# Patient Record
Sex: FEMALE | Race: BLACK OR AFRICAN AMERICAN | NOT HISPANIC OR LATINO | Employment: PART TIME | URBAN - METROPOLITAN AREA
[De-identification: names, ages, dates, MRNs, and addresses within clinical notes are randomized per-mention and may not be internally consistent; named-entity substitution may affect disease eponyms.]

---
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
## 2025-03-07 ENCOUNTER — OFFICE VISIT (OUTPATIENT)
Dept: URGENT CARE | Facility: CLINIC | Age: 20
End: 2025-03-07
Payer: COMMERCIAL

## 2025-03-07 VITALS
HEIGHT: 62 IN | BODY MASS INDEX: 24.34 KG/M2 | RESPIRATION RATE: 18 BRPM | DIASTOLIC BLOOD PRESSURE: 80 MMHG | HEART RATE: 84 BPM | WEIGHT: 132.25 LBS | SYSTOLIC BLOOD PRESSURE: 112 MMHG | OXYGEN SATURATION: 98 % | TEMPERATURE: 98 F

## 2025-03-07 DIAGNOSIS — L98.9 SKIN LESION OF CHEST WALL: Primary | ICD-10-CM

## 2025-03-07 RX ORDER — SULFAMETHOXAZOLE AND TRIMETHOPRIM 800; 160 MG/1; MG/1
1 TABLET ORAL 2 TIMES DAILY
Qty: 14 TABLET | Refills: 0 | Status: SHIPPED | OUTPATIENT
Start: 2025-03-07 | End: 2025-03-14

## 2025-03-07 RX ORDER — NORETHINDRONE ACETATE AND ETHINYL ESTRADIOL .02; 1 MG/1; MG/1
1 TABLET ORAL DAILY
COMMUNITY
Start: 2024-12-30

## 2025-03-07 NOTE — PATIENT INSTRUCTIONS
Warm compresses 4-5x daily. Take bactrim antibiotic until completion. If not resolved after treatment, or if lesion reoccurs, follow up with dermatologist. Call  to schedule. Likely infected sebaceous cyst based on exam.

## 2025-03-07 NOTE — PROGRESS NOTES
"Subjective:      Patient ID: Manuela Jordan is a 20 y.o. female.    Vitals:  height is 5' 2" (1.575 m) and weight is 60 kg (132 lb 4.4 oz). Her oral temperature is 98 °F (36.7 °C). Her blood pressure is 112/80 and her pulse is 84. Her respiration is 18 and oxygen saturation is 98%.     Chief Complaint: Abscess    Patient presents with possible abscess under the left breast. On set of symptoms Tuesday. OTC - nothing. Patient "popped" the abscess multiple times, and it is getting smaller. The area is red, raised and warm to touch.     Abscess  Chronicity:  NewProgression Since Onset: rapidly worsening  Location:  Torso  Associated Symptoms: no fever, no chills, no sweats  Characteristics: painful, redness and swelling    Characteristics: not draining, no itching, no dryness, no scaling, no peeling, no bruising and no blistering    Pain Scale:  3/10  Treatments Tried:  Nothing  Relieved by:  Nothing  Worsened by:  Nothing      Constitution: Negative for chills and fever.   Skin:  Positive for abscess. Negative for erythema.      Objective:     Physical Exam   Constitutional: She is oriented to person, place, and time. She appears well-developed.   HENT:   Head: Normocephalic and atraumatic. Head is without abrasion, without contusion and without laceration.   Ears:   Right Ear: External ear normal.   Left Ear: External ear normal.   Nose: Nose normal.   Mouth/Throat: Oropharynx is clear and moist and mucous membranes are normal.   Eyes: Conjunctivae, EOM and lids are normal. Pupils are equal, round, and reactive to light.   Neck: Trachea normal and phonation normal. Neck supple.   Cardiovascular: Normal rate, regular rhythm and normal heart sounds.   Pulmonary/Chest: Effort normal and breath sounds normal. No stridor. No respiratory distress.       Musculoskeletal: Normal range of motion.         General: Normal range of motion.   Neurological: She is alert and oriented to person, place, and time.   Skin: Skin is warm, " dry, intact and no rash. Capillary refill takes less than 2 seconds. No abrasion, No burn, No bruising, No erythema and No ecchymosis   Psychiatric: Her speech is normal and behavior is normal. Judgment and thought content normal.   Nursing note and vitals reviewed.      Assessment:     1. Skin lesion of chest wall        Plan:       Skin lesion of chest wall  -     sulfamethoxazole-trimethoprim 800-160mg (BACTRIM DS) 800-160 mg Tab; Take 1 tablet by mouth 2 (two) times daily. for 7 days  Dispense: 14 tablet; Refill: 0  -     Ambulatory referral/consult to Dermatology    Colleen Garcia PA-C  [facility]       Patient Instructions   Warm compresses 4-5x daily. Take bactrim antibiotic until completion. If not resolved after treatment, or if lesion reoccurs, follow up with dermatologist. Call  to schedule. Likely infected sebaceous cyst based on exam.         Medical Decision Making:   Urgent Care Management:  Suspect inflammed sebaceous cyst - no fluctuance amenable to I&D in clinic. Recommend compresses, bactrim. F/u with derm for complete excision if not improved after tx or reoccurs. Return to clinic if becomes fluctuant

## 2025-03-13 ENCOUNTER — OFFICE VISIT (OUTPATIENT)
Dept: URGENT CARE | Facility: CLINIC | Age: 20
End: 2025-03-13
Payer: COMMERCIAL

## 2025-03-13 VITALS
WEIGHT: 130.06 LBS | DIASTOLIC BLOOD PRESSURE: 81 MMHG | RESPIRATION RATE: 18 BRPM | SYSTOLIC BLOOD PRESSURE: 135 MMHG | HEIGHT: 62 IN | BODY MASS INDEX: 23.93 KG/M2 | HEART RATE: 91 BPM | OXYGEN SATURATION: 98 % | TEMPERATURE: 98 F

## 2025-03-13 DIAGNOSIS — T50.905A ADVERSE EFFECT OF DRUG, INITIAL ENCOUNTER: ICD-10-CM

## 2025-03-13 DIAGNOSIS — R11.2 NAUSEA AND VOMITING, UNSPECIFIED VOMITING TYPE: Primary | ICD-10-CM

## 2025-03-13 PROCEDURE — 99214 OFFICE O/P EST MOD 30 MIN: CPT | Mod: S$GLB,,, | Performed by: PHYSICIAN ASSISTANT

## 2025-03-13 PROCEDURE — S0119 ONDANSETRON 4 MG: HCPCS | Mod: S$GLB,,, | Performed by: EMERGENCY MEDICINE

## 2025-03-13 RX ORDER — ONDANSETRON 4 MG/1
4 TABLET, ORALLY DISINTEGRATING ORAL EVERY 8 HOURS PRN
Qty: 12 TABLET | Refills: 0 | Status: SHIPPED | OUTPATIENT
Start: 2025-03-13 | End: 2025-03-17

## 2025-03-13 RX ORDER — ONDANSETRON 4 MG/1
4 TABLET, ORALLY DISINTEGRATING ORAL
Status: COMPLETED | OUTPATIENT
Start: 2025-03-13 | End: 2025-03-13

## 2025-03-13 RX ADMIN — ONDANSETRON 4 MG: 4 TABLET, ORALLY DISINTEGRATING ORAL at 04:03

## 2025-03-13 NOTE — PATIENT INSTRUCTIONS
Take zofran every 8 hours as needed for nausea/vomiting. Make sure to take the zofran 45 minutes before your dose of bactrim antibiotic. Take the antibiotic with a little bit of food. Drink plenty of liquids and may drink Pedialyte OR add Powerade to water. However, avoid drinking only sports drinks as they contain a lot of sugar, which can actually worsen diarrhea. Slowly add bland diet as you are able to keep down liquids (bananas, rice, applesauce, toast, mashed potatoes). If you have been vomiting, avoid Motrin or NSAIDS which can worsen GI upset.     Follow up with dermatologist for skin cyst - call  to schedule.     You need to go to the ER if you are still having intractable vomiting or if you are not producing urine at least every 8 hours (to make sure you are not becoming dehydrated), if you are having severe abdominal pain, localized right lower abdominal pain, bloody vomit, or persistently having high volume black or bloody stool.

## 2025-03-13 NOTE — LETTER
March 13, 2025      [facility] [address] [place] [address]  Phone: [phone]  Fax: [phone]       Patient: Manuela Jordan   YOB: 2005  Date of Visit: 03/13/2025    To Whom It May Concern:    Jay Jordan  was at [facility] on 03/13/2025. The patient may return to work/school on 3/14/25 with no restrictions. If you have any questions or concerns, or if I can be of further assistance, please do not hesitate to contact me.    Sincerely,    Colleen Garcia PA-C  [facility]

## 2025-03-13 NOTE — PROGRESS NOTES
"Subjective:      Patient ID: Manuela Jordan is a 20 y.o. female.    Vitals:  height is 5' 2" (1.575 m) and weight is 59 kg (130 lb 1.1 oz). Her tympanic temperature is 97.9 °F (36.6 °C). Her blood pressure is 135/81 and her pulse is 91. Her respiration is 18 and oxygen saturation is 98%.     Chief Complaint: Vomiting    Patient presents with nausea, vomiting and abdominal cramps.  Vomited 3x today. Was able to keep down a powerade. Symptoms started 1 day ago. Nausea x 5 days. No diarrhea, fever, bloody stool, suspicious food intake, or sick contacts.   Patient is taking Bactrim for a cyst under breast x 7 days. States she is on day 5 of abx. Cyst is improving. No drainage      Emesis   This is a new problem. The current episode started yesterday. The problem has been gradually worsening. There has been no fever. Associated symptoms include abdominal pain and sweats. Pertinent negatives include no chest pain, chills, coughing, decreased urine volume, diarrhea, dizziness, fever or headaches. She has tried acetaminophen for the symptoms. The treatment provided mild relief.       Constitution: Negative for chills and fever.   Cardiovascular:  Negative for chest pain.   Respiratory:  Negative for cough, sputum production and shortness of breath.    Gastrointestinal:  Positive for abdominal pain, nausea and vomiting. Negative for diarrhea.   Genitourinary:  Negative for dysuria, frequency, urgency, urine decreased, flank pain, bladder incontinence, bed wetting and hematuria.   Skin:  Positive for abscess.   Neurological:  Negative for dizziness and headaches.      Objective:     Physical Exam   Constitutional: She is oriented to person, place, and time. She appears well-developed.   HENT:   Head: Normocephalic and atraumatic.   Ears:   Right Ear: External ear normal.   Left Ear: External ear normal.   Nose: Nose normal.   Mouth/Throat: Mucous membranes are normal. Oropharynx is clear.   Eyes: Conjunctivae and lids are " normal.   Neck: Trachea normal. Neck supple.   Cardiovascular: Normal rate, regular rhythm and normal heart sounds.   Pulmonary/Chest: Effort normal and breath sounds normal. No respiratory distress.       Abdominal: Normal appearance and bowel sounds are normal. She exhibits no distension and no mass. Soft. There is no abdominal tenderness. There is no rebound, no guarding, no left CVA tenderness and no right CVA tenderness.   Musculoskeletal: Normal range of motion.         General: Normal range of motion.   Neurological: She is alert and oriented to person, place, and time. She has normal strength.   Skin: Skin is warm, dry, intact, not diaphoretic and not pale.   Psychiatric: Her speech is normal and behavior is normal. Judgment and thought content normal.   Nursing note and vitals reviewed.      Assessment:     1. Nausea and vomiting, unspecified vomiting type    2. Adverse effect of drug, initial encounter        Plan:       Nausea and vomiting, unspecified vomiting type  -     ondansetron disintegrating tablet 4 mg  -     ondansetron (ZOFRAN-ODT) 4 MG TbDL; Take 1 tablet (4 mg total) by mouth every 8 (eight) hours as needed (nausea/vomiting).  Dispense: 12 tablet; Refill: 0    Adverse effect of drug, initial encounter      Colleen Garcia PA-C  [facility]       Patient Instructions   Take zofran every 8 hours as needed for nausea/vomiting. Make sure to take the zofran 45 minutes before your dose of bactrim antibiotic. Take the antibiotic with a little bit of food. Drink plenty of liquids and may drink Pedialyte OR add Powerade to water. However, avoid drinking only sports drinks as they contain a lot of sugar, which can actually worsen diarrhea. Slowly add bland diet as you are able to keep down liquids (bananas, rice, applesauce, toast, mashed potatoes). If you have been vomiting, avoid Motrin or NSAIDS which can worsen GI upset.     Follow up with dermatologist for skin cyst - call [phone]  [phone] to schedule.     You need to go to the ER if you are still having intractable vomiting or if you are not producing urine at least every 8 hours (to make sure you are not becoming dehydrated), if you are having severe abdominal pain, localized right lower abdominal pain, bloody vomit, or persistently having high volume black or bloody stool.         Medical Decision Making:   Differential Diagnosis:   Adverse drug effect from bactrim, gastritis, gastroenteritis  Urgent Care Management:  Abdomen soft nt. Tolerating po fluids at home without difficulty. Recommend zofran 45 mins prior to taking bactrim. Keep follow up with dermatologist as I suspect underlying sebaceous cyst to chest wall